# Patient Record
Sex: MALE | Race: WHITE | Employment: FULL TIME | URBAN - METROPOLITAN AREA
[De-identification: names, ages, dates, MRNs, and addresses within clinical notes are randomized per-mention and may not be internally consistent; named-entity substitution may affect disease eponyms.]

---

## 2018-01-02 ENCOUNTER — OFFICE VISIT (OUTPATIENT)
Dept: FAMILY MEDICINE CLINIC | Facility: CLINIC | Age: 58
End: 2018-01-02

## 2018-01-02 VITALS
TEMPERATURE: 99 F | DIASTOLIC BLOOD PRESSURE: 70 MMHG | SYSTOLIC BLOOD PRESSURE: 108 MMHG | OXYGEN SATURATION: 97 % | RESPIRATION RATE: 20 BRPM | HEART RATE: 86 BPM

## 2018-01-02 DIAGNOSIS — J06.9 VIRAL URI WITH COUGH: ICD-10-CM

## 2018-01-02 DIAGNOSIS — J40 BRONCHITIS: Primary | ICD-10-CM

## 2018-01-02 PROCEDURE — 99203 OFFICE O/P NEW LOW 30 MIN: CPT

## 2018-01-02 RX ORDER — LISINOPRIL 10 MG/1
10 TABLET ORAL DAILY
COMMUNITY

## 2018-01-02 RX ORDER — ALBUTEROL SULFATE 90 UG/1
2 AEROSOL, METERED RESPIRATORY (INHALATION) EVERY 6 HOURS PRN
Qty: 1 INHALER | Refills: 0 | Status: SHIPPED | OUTPATIENT
Start: 2018-01-02 | End: 2019-01-02

## 2018-01-02 RX ORDER — ROSUVASTATIN CALCIUM 10 MG/1
10 TABLET, COATED ORAL NIGHTLY
COMMUNITY

## 2018-01-02 RX ORDER — GUAIFENESIN AND CODEINE PHOSPHATE 100; 10 MG/5ML; MG/5ML
SOLUTION ORAL
Qty: 150 ML | Refills: 0 | Status: SHIPPED | OUTPATIENT
Start: 2018-01-02

## 2018-01-02 NOTE — PROGRESS NOTES
CHIEF COMPLAINT:   Patient presents with:  Cough/URI: since 12/29/17- fever, cough, sore throat    HPI:   Danuta Ruff is a 62year old male who presents with upper respiratory symptoms for  4  days.  Patient reports sore throat only at the beginning EYES: conjunctiva non-injected; no drainage  EARS: TM's dull, not red, no bulging, no retraction, no fluid, bony landmarks visible  NOSE: Nasal passages erythematous and swollen   THROAT: oral mucosa pink and moist; posterior pharynx mild red centrally, bu Air travels in and out of the lungs through the airways. The linings of these airways produce sticky mucus. This mucus traps particles that enter the lungs. Tiny structures called cilia then sweep the particles out of the airways.      Healthy airway: Airwa The main treatment for bronchitis is easing symptoms. Avoiding smoke, allergens, and other things that trigger coughing can often help. If the infection is bacterial, you may be given antibiotics. During the illness, it's important to get plenty of sleep. You should see your provider again in 2 to 3 weeks. By this time, symptoms should have improved. An infection that lasts longer may mean you have a more serious problem. Prevention  · Avoid tobacco smoke. If you smoke, quit. Stay away from smoky places.  A Bronchitis often occurs with a cold or the flu virus. The airways become inflamed (red and swollen). There is a deep hacking cough from the extra mucus.  Other symptoms may include:  · Wheezing  · Chest discomfort  · Shortness of breath  · Mild fever  A sec · Sit up or use extra pillows when in bed. This helps to lessen coughing and congestion. · Ask your provider about using medicine. Ask about using cough medicine, pain and fever medicine, or a decongestant.   Antibiotics  Most cases of bronchitis are cause · Breathing problems worsen or  become severe  · Symptoms don’t get better within a week, or within 3 days of taking antibiotics   Date Last Reviewed: 2/1/2017  © 3006-2117 [employer]. [address] [facility] [place], [address] [place] [address].  All rights re

## 2018-01-02 NOTE — PATIENT INSTRUCTIONS
What Is Acute Bronchitis? Acute bronchitis is when the airways in your lungs (bronchial tubes) become red and swollen (inflamed). It is usually caused by a viral infection. But it can also occur because of a bacteria or allergen.  Symptoms include a coug · A chest X-ray. This is done if your healthcare provider thinks you have pneumonia. · Tests to check for an underlying condition. Other tests may be done to check for things such as allergies, asthma, or COPD (chronic obstructive pulmonary disease).  You · Take the medicines as directed. For instance, some medicines should be taken with food. · Ask about side effects. Ask your provider or pharmacist what side effects are common, and what to do about them.   Follow-up care  You should see your provider db Healthy cilia: Tiny, hairlike cilia sweep mucus and particles up and out of the airways. Lungs with bronchitis  Bronchitis often occurs with a cold or the flu virus.  The airways become inflamed (red and swollen). There is a deep hacking cough from the ex · Use a humidifier. Or try breathing in steam from a hot shower. This may help loosen mucus. · Drink a lot of water and juice. They can soothe the throat and may help thin mucus. · Sit up or use extra pillows when in bed.  This helps to lessen coughing an · Wash your hands often. This helps reduce the chance of picking up viruses that cause colds and flu.   Call your healthcare provider if:  · Symptoms worsen, or you have new symptoms  · Breathing problems worsen or  become severe  · Symptoms don’t get mor

## 2018-01-22 ENCOUNTER — OFFICE VISIT (OUTPATIENT)
Dept: FAMILY MEDICINE CLINIC | Facility: CLINIC | Age: 58
End: 2018-01-22

## 2018-01-22 VITALS
TEMPERATURE: 98 F | OXYGEN SATURATION: 98 % | HEART RATE: 68 BPM | DIASTOLIC BLOOD PRESSURE: 80 MMHG | SYSTOLIC BLOOD PRESSURE: 122 MMHG | RESPIRATION RATE: 16 BRPM

## 2018-01-22 DIAGNOSIS — J02.9 SORE THROAT: Primary | ICD-10-CM

## 2018-01-22 DIAGNOSIS — J40 BRONCHITIS: ICD-10-CM

## 2018-01-22 LAB
CONTROL LINE PRESENT WITH A CLEAR BACKGROUND (YES/NO): YES YES/NO
STREP GRP A CUL-SCR: NEGATIVE

## 2018-01-22 PROCEDURE — 87880 STREP A ASSAY W/OPTIC: CPT | Performed by: NURSE PRACTITIONER

## 2018-01-22 PROCEDURE — 99213 OFFICE O/P EST LOW 20 MIN: CPT | Performed by: NURSE PRACTITIONER

## 2018-01-22 RX ORDER — FLUTICASONE PROPIONATE 50 MCG
2 SPRAY, SUSPENSION (ML) NASAL DAILY
Qty: 1 BOTTLE | Refills: 0 | Status: SHIPPED | OUTPATIENT
Start: 2018-01-22 | End: 2019-01-17

## 2018-01-22 RX ORDER — PREDNISONE 20 MG/1
40 TABLET ORAL DAILY
Qty: 10 TABLET | Refills: 0 | Status: SHIPPED | OUTPATIENT
Start: 2018-01-22 | End: 2018-01-27

## 2018-01-23 NOTE — PROGRESS NOTES
CHIEF COMPLAINT:   Patient presents with:  Pharyngitis: had uri around new years. cough has lingered. sore throat as well, has been getting worse. no known fevers. no ear pain, headaches. chest could be more congested today.  still coughing up some phlegm NEURO: denies dizziness or lightheadedness    EXAM:   /80 (BP Location: Left arm, Patient Position: Sitting, Cuff Size: adult)   Pulse 68   Temp 97.7 °F (36.5 °C) (Oral)   Resp 16   SpO2 98%   GENERAL: well developed, well nourished,in no apparent di Patient Instructions       When You Have a Sore Throat    A sore throat can be painful. There are many reasons why you may have a sore throat. Your healthcare provider will work with you to find the cause of your sore throat.  He or she will also find the b During the exam, your healthcare provider checks your ears, nose, and throat for problems.  He or she also checks for swelling in the neck, and may listen to your chest.  Possible tests  Other tests your healthcare provider may perform include:  · A throat If your sore throat is due to a bacterial infection, antibiotics may speed healing and prevent complications.  Although group A streptococcus (\"strep throat\" or GAS) is the major treatable infection for a sore throat, GAS causes only 5% to 15% of sore thr © 8201-2826 [employer]. [address] [facility] [place] [facility] [place]. All rights reserved. This information is not intended as a substitute for professional medical care. Always follow your healthcare professional's instructions.             The

## 2018-01-23 NOTE — PATIENT INSTRUCTIONS
When You Have a Sore Throat    A sore throat can be painful. There are many reasons why you may have a sore throat. Your healthcare provider will work with you to find the cause of your sore throat. He or she will also find the best treatment for you.   Wilda Oswald During the exam, your healthcare provider checks your ears, nose, and throat for problems.  He or she also checks for swelling in the neck, and may listen to your chest.  Possible tests  Other tests your healthcare provider may perform include:  · A throat If your sore throat is due to a bacterial infection, antibiotics may speed healing and prevent complications.  Although group A streptococcus (\"strep throat\" or GAS) is the major treatable infection for a sore throat, GAS causes only 5% to 15% of sore thr © 5862-5058 [employer] [address]. [address] [facility] [place], [facility] [place]. All rights reserved. This information is not intended as a substitute for professional medical care. Always follow your healthcare professional's instructions.

## 2024-02-23 ENCOUNTER — OFFICE VISIT (OUTPATIENT)
Dept: OTOLARYNGOLOGY | Facility: CLINIC | Age: 64
End: 2024-02-23

## 2024-02-23 VITALS — HEIGHT: 67 IN | BODY MASS INDEX: 34.21 KG/M2 | WEIGHT: 218 LBS

## 2024-02-23 DIAGNOSIS — H65.92 FLUID LEVEL BEHIND TYMPANIC MEMBRANE OF LEFT EAR: ICD-10-CM

## 2024-02-23 DIAGNOSIS — H69.90 EUSTACHIAN TUBE DISORDER, UNSPECIFIED LATERALITY: ICD-10-CM

## 2024-02-23 DIAGNOSIS — H61.22 IMPACTED CERUMEN, LEFT EAR: Primary | ICD-10-CM

## 2024-02-23 PROCEDURE — 69210 REMOVE IMPACTED EAR WAX UNI: CPT | Performed by: STUDENT IN AN ORGANIZED HEALTH CARE EDUCATION/TRAINING PROGRAM

## 2024-02-23 PROCEDURE — 99203 OFFICE O/P NEW LOW 30 MIN: CPT | Performed by: STUDENT IN AN ORGANIZED HEALTH CARE EDUCATION/TRAINING PROGRAM

## 2024-02-23 RX ORDER — TACROLIMUS 1 MG/G
OINTMENT TOPICAL
COMMUNITY

## 2024-02-23 RX ORDER — BETAMETHASONE DIPROPIONATE 0.05 %
OINTMENT (GRAM) TOPICAL
COMMUNITY

## 2024-02-23 RX ORDER — ZOSTER VACCINE RECOMBINANT, ADJUVANTED 50 MCG/0.5
KIT INTRAMUSCULAR
COMMUNITY

## 2024-02-23 RX ORDER — OMEPRAZOLE 40 MG/1
1 CAPSULE, DELAYED RELEASE ORAL DAILY
COMMUNITY
Start: 2023-10-11

## 2024-02-23 RX ORDER — FLUOCINOLONE ACETONIDE 0.1 MG/ML
SOLUTION TOPICAL
COMMUNITY
Start: 2021-09-15

## 2024-02-23 RX ORDER — CLOBETASOL PROPIONATE 0.5 MG/ML
LOTION TOPICAL
COMMUNITY
Start: 2023-10-27

## 2024-02-23 NOTE — PROGRESS NOTES
Nick Mobley is a 63 year old male.   Chief Complaint   Patient presents with    Ear Problem     Patient reports bilateral clogged ears.    New Patient       ASSESSMENT AND PLAN:   1. Impacted cerumen, left ear      2. Eustachian tube disorder, unspecified laterality  63-year-old presents with a clogged right ear.  Has been present just for 1 to 2 days.  Feels like there is fluid    Exam he has a serous effusion on the right.  Cerumen on the left that was debrided    He has a serous effusion on the right not infected.  Discussed treatment with Sudafed and Flonase for the next 2 weeks.  Deferred on oral steroids.  Can return in 3 to 4 weeks if not improved to consider myringotomy.  He is agreeable to plan    3. Fluid level behind tympanic membrane of left ear        The patient indicates understanding of these issues and agrees to the plan.      EXAM:   Ht 5' 7\" (1.702 m)   Wt 218 lb (98.9 kg)   BMI 34.14 kg/m²     Pertinent exam findings may also be noted above in assessment and plan     System Details   Skin Inspection - Normal.   Constitutional Overall appearance - Normal.   Head/Face Symmetric, TMJ tenderness not present    Eyes EOMI, PERRL   Right ear:  Canal clear, TM intact, no ALTON   Left ear:  Canal clear, TM intact, no ALTON   Nose: Septum midline, inferior turbinates not enlarged, nasal valves without collapse    Oral cavity/Oropharynx: No lesions or masses on inspection or palpation, tonsils symmetric    Neck: Soft without LAD, thyroid not enlarged  Voice clear/ no stridor   Other:      Scopes and Procedures:     Canals:  Left: Canal with cerumen preventing adequate view of TM, debrided with instrumentation    Tympanic Membranes:  Left: Normal tympanic membrane.     TM Visualized Method:   Left TM examined via otomicroscopy.      PROCEDURE:   Removal of cerumen impaction   The cerumen impaction was completely removed on the left side using microscopy as necessary.   Removal was completed by using a  curette and suction.         Current Outpatient Medications   Medication Sig Dispense Refill    betamethasone dipropionate 0.05 % External Ointment       Clobetasol Propionate 0.05 % External Lotion Apply to affected areas on scalp 2-4 times weekly as needed for flares.      Fluocinolone Acetonide 0.01 % External Solution APPLY TO EARS UP TO TWICE DAILY A FEW TIMES WEEKLY      Multiple Vitamin (MULTI-VITAMIN) Oral Tab Take 1 tablet by mouth daily.      Omeprazole 40 MG Oral Capsule Delayed Release Take 1 capsule (40 mg total) by mouth daily.      tacrolimus 0.1 % External Ointment       tretinoin 0.025 % External Cream Apply to affected area few times nightly.      Zoster Vac Recomb Adjuvanted (SHINGRIX) 50 MCG/0.5ML Intramuscular Recon Susp       lisinopril 10 MG Oral Tab Take 1 tablet (10 mg total) by mouth daily.      Rosuvastatin Calcium 10 MG Oral Tab Take 1 tablet (10 mg total) by mouth nightly.      aspirin 81 MG Oral Tab Take 1 tablet (81 mg total) by mouth daily.      Guaifenesin-Codeine 100-10 MG/5ML Oral Syrup Take 5-10 ml at bedtime as needed for cough. May repeat in 4 hours. Do not take with other cough or sleep remedies. 150 mL 0      Past Medical History:   Diagnosis Date    Essential hypertension     Hyperlipidemia     Psoriasis       Social History:  Social History     Socioeconomic History    Marital status:    Tobacco Use    Smoking status: Never    Smokeless tobacco: Never   Vaping Use    Vaping Use: Never used   Substance and Sexual Activity    Alcohol use: Yes    Drug use: Never          José Miguel Summers MD  2/23/2024  10:23 AM

## 2024-04-01 ENCOUNTER — OFFICE VISIT (OUTPATIENT)
Dept: OTOLARYNGOLOGY | Facility: CLINIC | Age: 64
End: 2024-04-01
Payer: COMMERCIAL

## 2024-04-01 DIAGNOSIS — H69.90 EUSTACHIAN TUBE DISORDER, UNSPECIFIED LATERALITY: Primary | ICD-10-CM

## 2024-04-01 DIAGNOSIS — H65.91 FLUID LEVEL BEHIND TYMPANIC MEMBRANE OF RIGHT EAR: ICD-10-CM

## 2024-04-01 DIAGNOSIS — J01.90 ACUTE SINUSITIS, RECURRENCE NOT SPECIFIED, UNSPECIFIED LOCATION: ICD-10-CM

## 2024-04-01 NOTE — PROGRESS NOTES
Nick Mobley is a 63 year old male.   Chief Complaint   Patient presents with    Follow - Up     Et disorder reevaluation, reports ears still feel clogged        ASSESSMENT AND PLAN:   1. Eustachian tube disorder, unspecified laterality  63-year-old presents in follow-up regarding a right-sided middle ear effusion.  He was seen on February 23 he had a serous effusion at that time.  He has not responded to medical management still continues to report pressure and muffled hearing on that side.  Is not reporting nasal congestion and drainage    Exam he has a serous effusion on the right that is still present.  Decision made today to perform myringotomy given his persistent symptoms despite medical management.    Large amount of serous effusion was evacuated.  He was able to hear a little bit better.  Discussed that effusions can return and that in general will take some time for the eustachian tube to open up and completely normalize.  If not better in 3 to 4 weeks he can return    2. Fluid level behind tympanic membrane of right ear      3. Acute sinusitis, recurrence not specified, unspecified location        The patient indicates understanding of these issues and agrees to the plan.      EXAM:   There were no vitals taken for this visit.    Pertinent exam findings may also be noted above in assessment and plan     System Details   Skin Inspection - Normal.   Constitutional Overall appearance - Normal.   Head/Face Symmetric, TMJ tenderness not present    Eyes EOMI, PERRL   Right ear:  Canal clear, TM intact, no ALTON   Left ear:  Canal clear, TM intact, no ALTON   Nose: Septum midline, inferior turbinates not enlarged, nasal valves without collapse    Oral cavity/Oropharynx: No lesions or masses on inspection or palpation, tonsils symmetric    Neck: Soft without LAD, thyroid not enlarged  Voice clear/ no stridor   Other:      Scopes and Procedures:     Procedures:  Myringotomy/Tympanostomy:  Pre-Procedure Care:  Consent was obtained. Procedure/risks were explained. Questions were answered. Correct patient identified. Correct side and site confirmed.   A myringotomy was performed in the kaiser-inferior quadrant of the right tympanic membrane.  Anesthetic used was Phenol placed topically. Patient tolerated procedure well.        Current Outpatient Medications   Medication Sig Dispense Refill    Clobetasol Propionate 0.05 % External Lotion Apply to affected areas on scalp 2-4 times weekly as needed for flares.      Fluocinolone Acetonide 0.01 % External Solution APPLY TO EARS UP TO TWICE DAILY A FEW TIMES WEEKLY      Omeprazole 40 MG Oral Capsule Delayed Release Take 1 capsule (40 mg total) by mouth daily.      tacrolimus 0.1 % External Ointment       Zoster Vac Recomb Adjuvanted (SHINGRIX) 50 MCG/0.5ML Intramuscular Recon Susp       lisinopril 10 MG Oral Tab Take 1 tablet (10 mg total) by mouth daily.      Rosuvastatin Calcium 10 MG Oral Tab Take 1 tablet (10 mg total) by mouth nightly.      aspirin 81 MG Oral Tab Take 1 tablet (81 mg total) by mouth daily.      betamethasone dipropionate 0.05 % External Ointment       Multiple Vitamin (MULTI-VITAMIN) Oral Tab Take 1 tablet by mouth daily.      tretinoin 0.025 % External Cream Apply to affected area few times nightly.      Guaifenesin-Codeine 100-10 MG/5ML Oral Syrup Take 5-10 ml at bedtime as needed for cough. May repeat in 4 hours. Do not take with other cough or sleep remedies. 150 mL 0      Past Medical History:   Diagnosis Date    Essential hypertension     Hyperlipidemia     Psoriasis       Social History:  Social History     Socioeconomic History    Marital status:    Tobacco Use    Smoking status: Never    Smokeless tobacco: Never   Vaping Use    Vaping Use: Never used   Substance and Sexual Activity    Alcohol use: Yes    Drug use: Never          José Miguel Summers MD  4/1/2024  3:18 PM

## 2024-05-24 ENCOUNTER — OFFICE VISIT (OUTPATIENT)
Dept: OTOLARYNGOLOGY | Facility: CLINIC | Age: 64
End: 2024-05-24

## 2024-05-24 DIAGNOSIS — H69.90 EUSTACHIAN TUBE DISORDER, UNSPECIFIED LATERALITY: Primary | ICD-10-CM

## 2024-05-24 DIAGNOSIS — H65.91 FLUID LEVEL BEHIND TYMPANIC MEMBRANE OF RIGHT EAR: ICD-10-CM

## 2024-05-24 NOTE — PROGRESS NOTES
Nick Mobley is a 63 year old male.   Chief Complaint   Patient presents with    Follow - Up     Eustachian tube disorder, reevaluation on fluid in ear. Reports clogging to continue        ASSESSMENT AND PLAN:   1. Eustachian tube disorder, unspecified laterality  Is a 63-year-old who presents in follow-up regarding fluid on his right ear drum.  He had a myringotomy performed on April 1.  He says it did help for several days but then it has slowly reaccumulated    On exam he has a serous effusion on the right with a well-healed myringotomy site    Myringotomy performed he decided to have this performed today due to the recurrent nature.  Also did perform a nasal endoscopy that was negative for any obstruction of the eustachian tube orifice.    A thin serous effusion was evacuated.  He had a partial improvement in his hearing.  Discussed that this continues to return that we may consider an ear tube.  He is going to use Flonase and Sudafed.  He can return back to see me in 3 to 4 weeks if still not improving    2. Fluid level behind tympanic membrane of right ear        The patient indicates understanding of these issues and agrees to the plan.      EXAM:   There were no vitals taken for this visit.    Pertinent exam findings may also be noted above in assessment and plan     System Details   Skin Inspection - Normal.   Constitutional Overall appearance - Normal.   Head/Face Symmetric, TMJ tenderness not present    Eyes EOMI, PERRL   Right ear:  Canal clear, TM intact, no ALTON   Left ear:  Canal clear, TM intact, no ALTON   Nose: Septum midline, inferior turbinates not enlarged, nasal valves without collapse    Oral cavity/Oropharynx: No lesions or masses on inspection or palpation, tonsils symmetric    Neck: Soft without LAD, thyroid not enlarged  Voice clear/ no stridor   Other:      Scopes and Procedures:       Procedures:  Myringotomy/Tympanostomy:  Pre-Procedure Care: Consent was obtained. Procedure/risks  were explained. Questions were answered. Correct patient identified. Correct side and site confirmed.   A myringotomy was performed in the kaiser-inferior quadrant of the right tympanic membrane.  Anesthetic used was Phenol placed topically. Patient tolerated procedure well.      Nasal Endoscopy Procedure Note     Due to inability for adequate examination of the nose and nasopharynx and need for magnification to perform the examination, endoscopy was performed.  Risks and benefits were discussed with patient/family and they have given verbal consent to proceed.    Pre-operative Diagnosis:   1. Eustachian tube disorder, unspecified laterality    2. Fluid level behind tympanic membrane of right ear        Post-operative Diagnosis: Same    Procedure: Diagnostic nasal endoscopy    Anesthesia: Topical anesthetic Willet     Surgeon José Miguel Summers MD    EBL: 0cc    Procedure Detail & Findings:     After placement of topical anesthetic intranasally the endoscope was inserted into each nares and driven through the nasal cavity into the nasopharynx. The following findings were noted:    Septum: Midline  Inferior turbinates: Normal  Middle meatus: Patent  Middle turbinates: Normal  Purulence: None noted  Polyps: None noted  Nasopharynx and eustachian tube: No masses  Other: The middle and superior meatus, the turbinates, and the spheno-ethmoid recess were inspected and seen to be without significant abnormal findings.   There is no obstruction of the eustachian tube on the right side    Condition: Stable    Complications: Patient tolerated the procedure well with no immediate complication.    José Miguel Summers MD    Current Outpatient Medications   Medication Sig Dispense Refill    Clobetasol Propionate 0.05 % External Lotion Apply to affected areas on scalp 2-4 times weekly as needed for flares.      Fluocinolone Acetonide 0.01 % External Solution APPLY TO EARS UP TO TWICE DAILY A FEW TIMES WEEKLY      Omeprazole 40 MG Oral Capsule  Delayed Release Take 1 capsule (40 mg total) by mouth daily.      tacrolimus 0.1 % External Ointment       Zoster Vac Recomb Adjuvanted (SHINGRIX) 50 MCG/0.5ML Intramuscular Recon Susp       lisinopril 10 MG Oral Tab Take 1 tablet (10 mg total) by mouth daily.      Rosuvastatin Calcium 10 MG Oral Tab Take 1 tablet (10 mg total) by mouth nightly.      aspirin 81 MG Oral Tab Take 1 tablet (81 mg total) by mouth daily.      betamethasone dipropionate 0.05 % External Ointment       Multiple Vitamin (MULTI-VITAMIN) Oral Tab Take 1 tablet by mouth daily.      tretinoin 0.025 % External Cream Apply to affected area few times nightly.      Guaifenesin-Codeine 100-10 MG/5ML Oral Syrup Take 5-10 ml at bedtime as needed for cough. May repeat in 4 hours. Do not take with other cough or sleep remedies. 150 mL 0      Past Medical History:    Essential hypertension    Hyperlipidemia    Psoriasis      Social History:  Social History     Socioeconomic History    Marital status:    Tobacco Use    Smoking status: Never    Smokeless tobacco: Never   Vaping Use    Vaping status: Never Used   Substance and Sexual Activity    Alcohol use: Yes    Drug use: Never     Social Determinants of Health     Food Insecurity: Low Risk  (4/20/2023)    Received from [facility]    Food Insecurity     Have there been times that your food ran out, and you didn't have money to get more?: No     Are there times that you worry that this might happen?: No   Transportation Needs: Low Risk  (4/20/2023)    Received from [facility]    Transportation Needs     Do you have trouble getting transportation to medical appointments?: No     How do you normally get to and from your appointments?: Other   Housing Stability: Low Risk  (4/20/2023)    Received from [facility]    Housing Stability      Are you concerned about having a safe and reliable place to live?: No          José Miguel Summers MD  5/24/2024  10:59 AM

## 2025-07-03 ENCOUNTER — OFFICE VISIT (OUTPATIENT)
Dept: PODIATRY CLINIC | Facility: CLINIC | Age: 65
End: 2025-07-03
Payer: COMMERCIAL

## 2025-07-03 DIAGNOSIS — G57.82 NEUROMA OF THIRD INTERSPACE OF LEFT FOOT: Primary | ICD-10-CM

## 2025-07-03 DIAGNOSIS — M20.41 HAMMER TOES OF BOTH FEET: ICD-10-CM

## 2025-07-03 DIAGNOSIS — L84 CALLUS OF FOOT: ICD-10-CM

## 2025-07-03 DIAGNOSIS — L60.0 ONYCHOCRYPTOSIS: ICD-10-CM

## 2025-07-03 DIAGNOSIS — G57.62 MORTON'S METATARSALGIA, NEURALGIA, OR NEUROMA, LEFT: ICD-10-CM

## 2025-07-03 DIAGNOSIS — M20.42 HAMMER TOES OF BOTH FEET: ICD-10-CM

## 2025-07-06 NOTE — PROGRESS NOTES
Nick Mobley is a 64 year old male.   Chief Complaint   Patient presents with    Foot Pain     Consult left foot metatarsal bones, declines pain today but has soreness. 2 days ago started to have pain, declines injury.         HPI:   Pleasant gentleman presents to the clinic concerned about his metatarsal bones and some contractures of his toes denies any pain today but did have soreness a couple of days ago no history of injury trauma change in shoe gear or activity.  In addition he gets a callus on the bottom of his fourth toe on the left foot and he has a mildly ingrown toenail on the left great toe.  At today's visit reviewed nurse's history as taken above, allergies medications and medical history as documented below.  All changes duly noted  Allergies: Patient has no known allergies.   Current Medications[1]   Past Medical History[2]   Past Surgical History[3]   Family History[4]   Social Hx on file[5]        REVIEW OF SYSTEMS:   Today reviewed systens as documented below  GENERAL HEALTH: feels well otherwise  SKIN: Refer to exam below  RESPIRATORY: denies shortness of breath with exertion  CARDIOVASCULAR: denies chest pain on exertion  GI: denies abdominal pain and denies heartburn  NEURO: denies headaches    EXAM:   There were no vitals taken for this visit.  GENERAL: well developed, well nourished, in no apparent distress  EXTREMITIES:   1. Integument: Skin on his feet is warm and dry he has a pinch callus on the plantar aspect of his fourth toe of the left foot.  It is not symptomatic but is wondering why it occurs.  Medial nail border of his left hallux is incurvated with some hyperkeratotic impaction in the distal groove.   2. Vascular: Patient has palpable dorsalis pedis posterior tibial pulses bilateral   3. Neurologic: Patient has intact sensorium has an easily palpable neuroma with positive Rubén's test in the third interspace of his left foot however it is not painful.   4. Musculoskeletal:  In the stance position the patient has an adductovarus hammertoe of the fourth digit which mildly under net labs the third this is most likely causing his pinch callus of the fourth toe on the left foot.    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  Using a slant back technique trimmed out debrided the onychocryptotic nail edge.  He was concerned about certain contractures of his toes but on forefoot loading they almost completely reduce showing that they are flexible and they do not hurt with the exception of the fourth toe that develops a pinch callus and I just gave him conservative management for that by filing with an emery board.  He did not want me to trim it at today's visit    The patient indicates understanding of these issues and agrees to the plan.    Roosevelt Rain, DPM       [1]   Current Outpatient Medications   Medication Sig Dispense Refill    Omeprazole 40 MG Oral Capsule Delayed Release Take 1 capsule (40 mg total) by mouth daily.      lisinopril 10 MG Oral Tab Take 1 tablet (10 mg total) by mouth daily.      Rosuvastatin Calcium 10 MG Oral Tab Take 1 tablet (10 mg total) by mouth nightly.      aspirin 81 MG Oral Tab Take 1 tablet (81 mg total) by mouth daily.     [2]   Past Medical History:   Essential hypertension    Hyperlipidemia    Psoriasis   [3] History reviewed. No pertinent surgical history.  [4]   Family History  Problem Relation Age of Onset    Other (lung can) Father     Other (parki) Mother     Dementia Maternal Grandmother     Dementia  Maternal Grandfather    [5]   Social History  Socioeconomic History    Marital status:    Tobacco Use    Smoking status: Never    Smokeless tobacco: Never   Vaping Use    Vaping status: Never Used   Substance and Sexual Activity    Alcohol use: Yes    Drug use: Never

## (undated) NOTE — LETTER
AUTHORIZATION FOR SURGICAL OPERATION OR OTHER PROCEDURE    1. I hereby authorize José Miguel Ho, and [facility] staff assigned to my case to perform the following operation and/or procedure at the [facility] site:    _______________________________________________________________________________________________    Right ear myringotomy   _______________________________________________________________________________________________    2.  My physician has explained the nature and purpose of the operation or other procedure, possible alternative methods of treatment, the risks involved, and the possibility of complication to me.  I acknowledge that no guarantee has been made as to the result that may be obtained.  3.  I recognize that, during the course of this operation, or other procedure, unforseen conditions may necessitate additional or different procedure than those listed above.  I, therefore, further authorize and request that the above named physician, his/her physician assistants or designees perform such procedures as are, in his/her professional opinion, necessary and desirable.  4.  Any tissue or organs removed in the operation or other procedure may be disposed of by and at the discretion of the [facility] and [facility].  5.  I understand that in the event of a medical emergency, I will be transported by local paramedics to [facility] or other hospital emergency department.  6.  I certify that I have read and fully understand the above consent to operation and/or other procedure.    7.  I acknowledge that my physician has explained sedation/analgesia administration to me including the risks and benefits.  I consent to the administration of sedation/analgesia as may be necessary or desirable in the judgement of my physician.    Witness signature: ___________________________________________________ Date:  ______/______/_____                     Time:  ________ A.M.  P.M.       Patient Name:  ______________________________________________________  (please print)      Patient signature:  ___________________________________________________             Relationship to Patient:           []  Parent    Responsible person                          []  Spouse  In case of minor or                    [] Other  _____________   Incompetent name:  __________________________________________________                               (please print)      _____________      Responsible person  In case of minor or  Incompetent signature:  _______________________________________________    Statement of Physician  My signature below affirms that prior to the time of the procedure, I have explained to the patient and/or his/her guardian, the risks and benefits involved in the proposed treatment and any reasonable alternative to the proposed treatment.  I have also explained the risks and benefits involved in the refusal of the proposed treatment and have answered the patient's questions.                        Date:  ______/______/_______  Provider                      Signature:  __________________________________________________________       Time:  ___________ A.M    P.M.

## (undated) NOTE — LETTER
AUTHORIZATION FOR SURGICAL OPERATION OR OTHER PROCEDURE  1. I hereby authorize José Miguel Ho, and [facility] staff assigned to my case to perform the following operation and/or procedure at the [facility] site:    _______________________________________________________________________________________________    Bilateral myringotomy   _______________________________________________________________________________________________    2.  My physician has explained the nature and purpose of the operation or other procedure, possible alternative methods of treatment, the risks involved, and the possibility of complication to me.  I acknowledge that no guarantee has been made as to the result that may be obtained.  3.  I recognize that, during the course of this operation, or other procedure, unforseen conditions may necessitate additional or different procedure than those listed above.  I, therefore, further authorize and request that the above named physician, his/her physician assistants or designees perform such procedures as are, in his/her professional opinion, necessary and desirable.  4.  Any tissue or organs removed in the operation or other procedure may be disposed of by and at the discretion of the [facility] and [facility].  5.  I understand that in the event of a medical emergency, I will be transported by local paramedics to [facility] or other hospital emergency department.  6.  I certify that I have read and fully understand the above consent to operation and/or other procedure.    7.  I acknowledge that my physician has explained sedation/analgesia administration to me including the risks and benefits.  I consent to the administration of sedation/analgesia as may be necessary or desirable in the judgement of my physician.    Witness signature: ___________________________________________________ Date:  ______/______/_____                     Time:  ________ A.M.  P.M.       Patient Name:  ______________________________________________________  (please print)      Patient signature:  ___________________________________________________             Relationship to Patient:           []  Parent    Responsible person                          []  Spouse  In case of minor or                    [] Other  _____________   Incompetent name:  __________________________________________________                               (please print)      _____________      Responsible person  In case of minor or  Incompetent signature:  _______________________________________________    Statement of Physician  My signature below affirms that prior to the time of the procedure, I have explained to the patient and/or his/her guardian, the risks and benefits involved in the proposed treatment and any reasonable alternative to the proposed treatment.  I have also explained the risks and benefits involved in the refusal of the proposed treatment and have answered the patient's questions.                        Date:  ______/______/_______  Provider                      Signature:  __________________________________________________________       Time:  ___________ A.M    P.M.